# Patient Record
Sex: MALE | Race: WHITE | NOT HISPANIC OR LATINO | Employment: UNEMPLOYED | URBAN - NONMETROPOLITAN AREA
[De-identification: names, ages, dates, MRNs, and addresses within clinical notes are randomized per-mention and may not be internally consistent; named-entity substitution may affect disease eponyms.]

---

## 2017-01-03 ENCOUNTER — OFFICE VISIT (OUTPATIENT)
Dept: CARDIAC SURGERY | Facility: CLINIC | Age: 67
End: 2017-01-03

## 2017-01-03 ENCOUNTER — HOSPITAL ENCOUNTER (OUTPATIENT)
Dept: OTHER | Facility: HOSPITAL | Age: 67
Discharge: HOME | End: 2017-01-03
Attending: THORACIC SURGERY (CARDIOTHORACIC VASCULAR SURGERY) | Admitting: THORACIC SURGERY (CARDIOTHORACIC VASCULAR SURGERY)

## 2017-01-03 VITALS
BODY MASS INDEX: 29.52 KG/M2 | HEIGHT: 74 IN | HEART RATE: 86 BPM | OXYGEN SATURATION: 95 % | DIASTOLIC BLOOD PRESSURE: 74 MMHG | SYSTOLIC BLOOD PRESSURE: 131 MMHG | TEMPERATURE: 98.3 F | WEIGHT: 230 LBS

## 2017-01-03 DIAGNOSIS — I65.23 BILATERAL CAROTID ARTERY STENOSIS: ICD-10-CM

## 2017-01-03 DIAGNOSIS — I73.9 CLAUDICATION (HCC): ICD-10-CM

## 2017-01-03 DIAGNOSIS — J98.11 MILD BASILAR ATELECTASIS OF BOTH LUNGS: Primary | ICD-10-CM

## 2017-01-03 DIAGNOSIS — R09.89 BRUIT: ICD-10-CM

## 2017-01-03 DIAGNOSIS — R60.0 LOCALIZED EDEMA: ICD-10-CM

## 2017-01-03 DIAGNOSIS — M79.671 RIGHT FOOT PAIN: ICD-10-CM

## 2017-01-03 PROCEDURE — 99202 OFFICE O/P NEW SF 15 MIN: CPT | Performed by: THORACIC SURGERY (CARDIOTHORACIC VASCULAR SURGERY)

## 2017-01-03 RX ORDER — POTASSIUM CHLORIDE 20 MEQ/1
20 TABLET, EXTENDED RELEASE ORAL 2 TIMES DAILY
COMMUNITY

## 2017-01-03 RX ORDER — WARFARIN SODIUM 5 MG/1
5 TABLET ORAL
COMMUNITY

## 2017-01-03 RX ORDER — GABAPENTIN 600 MG/1
600 TABLET ORAL 3 TIMES DAILY
COMMUNITY

## 2017-01-03 RX ORDER — TORSEMIDE 20 MG/1
20 TABLET ORAL DAILY
COMMUNITY

## 2017-01-03 RX ORDER — QUETIAPINE FUMARATE 400 MG/1
400 TABLET, FILM COATED ORAL NIGHTLY
COMMUNITY

## 2017-01-03 RX ORDER — INSULIN GLARGINE 100 [IU]/ML
INJECTION, SOLUTION SUBCUTANEOUS DAILY
COMMUNITY
End: 2017-02-14 | Stop reason: CLARIF

## 2017-01-03 RX ORDER — ONDANSETRON 4 MG/1
4 TABLET, FILM COATED ORAL EVERY 8 HOURS PRN
COMMUNITY

## 2017-01-03 RX ORDER — WARFARIN SODIUM 3 MG/1
3 TABLET ORAL
COMMUNITY

## 2017-01-03 RX ORDER — PREGABALIN 50 MG/1
50 CAPSULE ORAL 3 TIMES DAILY
COMMUNITY

## 2017-01-04 ENCOUNTER — OUTSIDE FACILITY SERVICE (OUTPATIENT)
Dept: FAMILY MEDICINE CLINIC | Facility: CLINIC | Age: 67
End: 2017-01-04

## 2017-01-04 PROCEDURE — 99307 SBSQ NF CARE SF MDM 10: CPT | Performed by: FAMILY MEDICINE

## 2017-01-04 NOTE — PROGRESS NOTES
"Yousif Anthony  1950            66 y.o.      1/3/2017    Chief Complaint   Patient presents with   • Deep Vein Thrombosis    66-year-old man resident of [facility] in  [place] under and under the care of  was referred with history of left carotid endarterectomy, bilaterall shoulder replacement arthrodeses, edema of both upper extremities more prominent left and right, edema both lower extremities and suspicion of arterial insufficiency both lower extremities An \"in house\" ultrasound performed by [facility] effort performed Venous Duplex scan  Left upper extremity 11-14-16 with finding of DVT or \"No flow or augmentation in left brachial, basilic or cephalic veins\"  On this basis anticoagulation  Was begun by Dr. Frazier with Coumadin and today;s INR is 2.7 (Interpretation dictated from [place], [place])/ The patient's overwhelming and repeated requests was for something to give him relief of pain of various sorts in multiple locations.        HPI  1. 2013 direct myocardial revascularization ×5 at [facility]  2.  2 shoulder operations Dr. Champagne.[facility]  3.  Right shoulder total prosthesis, Dr. Islas Felch  4.  Left shoulder total prosthesis, Dr. Islas Felch  5.  Arthroscopic surgery right knee ×3 [facility]  6.  Left carotid endarterectomy [facility]  7.  Multiple Back surgeries.        ROS Diabetes mellitus insulin dependant   , generalized \"arthritis\"pain in multiple locations and pain lateral aspect right foot lateral to  proximal 5th metatarsal prominence.    E.R visits 12-9-16  \"Chest x-ray\" Nodular  Opacity right mid lung field . Basilar atelectasis  D-Dimer <270     E.R visit 12-31-16 abdominal pain  CT abdomen without contrast RLL atelectasis fatty infiltration of liver. Labs normal    Current Outpatient Prescriptions:   •  aspirin 81 MG tablet, Take 81 mg by mouth Daily., " "Disp: , Rfl:   •  gabapentin (NEURONTIN) 600 MG tablet, Take 600 mg by mouth 3 (Three) Times a Day., Disp: , Rfl:   •  insulin aspart (novoLOG FLEXPEN) 100 UNIT/ML solution pen-injector sc pen, Inject  under the skin 3 (Three) Times a Day With Meals., Disp: , Rfl:   •  insulin glargine (LANTUS) 100 UNIT/ML injection, Inject  under the skin Daily., Disp: , Rfl:   •  ondansetron (ZOFRAN) 4 MG tablet, Take 4 mg by mouth Every 8 (Eight) Hours As Needed for nausea or vomiting., Disp: , Rfl:   •  potassium chloride (K-DUR,KLOR-CON) 20 MEQ CR tablet, Take 20 mEq by mouth 2 (Two) Times a Day., Disp: , Rfl:   •  pregabalin (LYRICA) 50 MG capsule, Take 50 mg by mouth 3 (Three) Times a Day., Disp: , Rfl:   •  QUEtiapine (SEROQUEL) 400 MG tablet, Take 400 mg by mouth Every Night., Disp: , Rfl:   •  torsemide (DEMADEX) 20 MG tablet, Take 20 mg by mouth Daily. 2 tab. By mouth daily, Disp: , Rfl:   •  warfarin (COUMADIN) 3 MG tablet, Take 3 mg by mouth Daily., Disp: , Rfl:   •  warfarin (COUMADIN) 5 MG tablet, Take 5 mg by mouth Daily., Disp: , Rfl:     The following portions of the patient's history were reviewed and updated as appropriate: allergies, current medications, past family history, past medical history, past social history, past surgical history and problem list.    Physical Exam  Visit Vitals   • /74 (BP Location: Left arm)   • Pulse 86   • Temp 98.3 °F (36.8 °C) (Oral)   • Ht 74\" (188 cm)   • Wt 230 lb (104 kg)   • SpO2 95%   • BMI 29.53 kg/m2       HEENT: Semi-vertical scar left neck marking site of previous carotid endarterectomy    CHEST: Relatively clear to auscultation    HEART: Regular rate and rhythm    ABDOMEN: Rotund    EXTREMITIES: Mild edema present in both lower extremities making it difficult to palpate ankle  pulses with the patient in the sitting position in his wheelchair    VASCULAR LAB STUDIES:DEV (1-3-17)    Right 0.93        Left 0.95         WAVE FORMS                             PT  " "Triphasic          Triphasic                                                               DP   Triphasic         Triphasic  Arterial perfusion both lower extremities at rest normal and adequate    CAROTID DUPLEX SCAN    (1-3-17)  RIGHT   0-15%             LEFT  0-15%           VERTEBRAL FLOW                    Antegrade                    Antegrade    BILATERAL LOWER EXTREMITY VENOUS DUPLEX SCAN    NO DVT                                                              U/S AORTA NO AAA          RADIOLOGY:      Mild basilar atelectasis of both lungs [J98.11]    IMPRESSION:  1. Normal carotid arteries bilaterally  2.No DVT in either lower extremity by venous duplex scan  3.Normal arterial perfusion both lower extremities at rest  4.\"Negative\" x-ray right foot  Right foot likely needs to be off-loaded with protective foot device                Assessment/Plan  Yousif was seen today for deep vein thrombosis.    Diagnoses and all orders for this visit:    Mild basilar atelectasis of both lungs  -     CT Angiogram Chest With & Without Contrast    Claudication  -     Ankle Brachial Index    Localized edema  -     Duplex Venous Lower Extremity - Bilateral CAR    Bruit  -     US Abdominal Aorta Limited    Bilateral carotid artery stenosis  -     US carotid bilateral    Right foot pain  -     XR foot 2 vw right                Plan   CT chest with I.V contrast (Administer contrast via left upper extremity)  Return when that CT study available.        "

## 2017-01-16 ENCOUNTER — OUTSIDE FACILITY SERVICE (OUTPATIENT)
Dept: FAMILY MEDICINE CLINIC | Facility: CLINIC | Age: 67
End: 2017-01-16

## 2017-01-16 PROCEDURE — 99307 SBSQ NF CARE SF MDM 10: CPT | Performed by: FAMILY MEDICINE

## 2017-01-30 ENCOUNTER — OUTSIDE FACILITY SERVICE (OUTPATIENT)
Dept: FAMILY MEDICINE CLINIC | Facility: CLINIC | Age: 67
End: 2017-01-30

## 2017-01-30 PROCEDURE — 99307 SBSQ NF CARE SF MDM 10: CPT | Performed by: FAMILY MEDICINE

## 2017-02-01 ENCOUNTER — OUTSIDE FACILITY SERVICE (OUTPATIENT)
Dept: FAMILY MEDICINE CLINIC | Facility: CLINIC | Age: 67
End: 2017-02-01

## 2017-02-01 PROCEDURE — 99307 SBSQ NF CARE SF MDM 10: CPT | Performed by: FAMILY MEDICINE

## 2017-02-08 ENCOUNTER — APPOINTMENT (OUTPATIENT)
Dept: LAB | Facility: HOSPITAL | Age: 67
End: 2017-02-08

## 2017-02-08 ENCOUNTER — OFFICE VISIT (OUTPATIENT)
Dept: CARDIOLOGY | Facility: CLINIC | Age: 67
End: 2017-02-08

## 2017-02-08 VITALS
BODY MASS INDEX: 32.24 KG/M2 | HEIGHT: 74 IN | SYSTOLIC BLOOD PRESSURE: 140 MMHG | DIASTOLIC BLOOD PRESSURE: 70 MMHG | HEART RATE: 84 BPM | OXYGEN SATURATION: 96 % | WEIGHT: 251.25 LBS

## 2017-02-08 DIAGNOSIS — Z86.73 H/O: CVA (CEREBROVASCULAR ACCIDENT): ICD-10-CM

## 2017-02-08 DIAGNOSIS — E78.2 MIXED HYPERLIPIDEMIA: ICD-10-CM

## 2017-02-08 DIAGNOSIS — Z79.4 TYPE 2 DIABETES MELLITUS WITH COMPLICATION, WITH LONG-TERM CURRENT USE OF INSULIN (HCC): ICD-10-CM

## 2017-02-08 DIAGNOSIS — Z86.718 HISTORY OF DVT (DEEP VEIN THROMBOSIS): ICD-10-CM

## 2017-02-08 DIAGNOSIS — R60.1 GENERALIZED EDEMA: Primary | ICD-10-CM

## 2017-02-08 DIAGNOSIS — I25.10 CORONARY ARTERY DISEASE INVOLVING NATIVE CORONARY ARTERY OF NATIVE HEART WITHOUT ANGINA PECTORIS: ICD-10-CM

## 2017-02-08 DIAGNOSIS — M10.042 ACUTE IDIOPATHIC GOUT OF LEFT HAND: ICD-10-CM

## 2017-02-08 DIAGNOSIS — E11.8 TYPE 2 DIABETES MELLITUS WITH COMPLICATION, WITH LONG-TERM CURRENT USE OF INSULIN (HCC): ICD-10-CM

## 2017-02-08 DIAGNOSIS — K59.00 CONSTIPATION, UNSPECIFIED CONSTIPATION TYPE: ICD-10-CM

## 2017-02-08 DIAGNOSIS — E78.1 HYPERTRIGLYCERIDEMIA: ICD-10-CM

## 2017-02-08 PROBLEM — I65.23 BILATERAL CAROTID ARTERY STENOSIS: Status: RESOLVED | Noted: 2017-01-03 | Resolved: 2017-02-08

## 2017-02-08 LAB — URATE SERPL-MCNC: 7.6 MG/DL (ref 2.5–8.5)

## 2017-02-08 PROCEDURE — 84550 ASSAY OF BLOOD/URIC ACID: CPT | Performed by: NURSE PRACTITIONER

## 2017-02-08 PROCEDURE — 96372 THER/PROPH/DIAG INJ SC/IM: CPT | Performed by: NURSE PRACTITIONER

## 2017-02-08 PROCEDURE — 99203 OFFICE O/P NEW LOW 30 MIN: CPT | Performed by: NURSE PRACTITIONER

## 2017-02-08 RX ORDER — ALLOPURINOL 100 MG/1
100 TABLET ORAL DAILY
Qty: 30 TABLET | Refills: 0 | Status: SHIPPED | OUTPATIENT
Start: 2017-02-08

## 2017-02-08 RX ORDER — COLCHICINE 0.6 MG/1
TABLET ORAL
Qty: 15 TABLET | Refills: 0 | Status: SHIPPED | OUTPATIENT
Start: 2017-02-08

## 2017-02-08 RX ORDER — FUROSEMIDE 10 MG/ML
40 INJECTION INTRAMUSCULAR; INTRAVENOUS ONCE
Status: COMPLETED | OUTPATIENT
Start: 2017-02-08 | End: 2017-02-08

## 2017-02-08 RX ORDER — DOCUSATE SODIUM 100 MG/1
100 CAPSULE, LIQUID FILLED ORAL 2 TIMES DAILY
Qty: 60 CAPSULE | Refills: 3 | Status: SHIPPED | OUTPATIENT
Start: 2017-02-08

## 2017-02-08 RX ORDER — ATORVASTATIN CALCIUM 40 MG/1
40 TABLET, FILM COATED ORAL NIGHTLY
Qty: 30 TABLET | Refills: 3 | Status: SHIPPED | OUTPATIENT
Start: 2017-02-08

## 2017-02-08 RX ORDER — DOCUSATE SODIUM 50 MG/5ML
150 LIQUID ORAL 2 TIMES DAILY
Status: DISCONTINUED | OUTPATIENT
Start: 2017-02-08 | End: 2017-02-08

## 2017-02-08 RX ORDER — FENOFIBRATE 145 MG/1
145 TABLET, COATED ORAL DAILY
Qty: 30 TABLET | Refills: 3 | Status: SHIPPED | OUTPATIENT
Start: 2017-02-08

## 2017-02-08 RX ADMIN — FUROSEMIDE 40 MG: 10 INJECTION INTRAMUSCULAR; INTRAVENOUS at 14:50

## 2017-02-08 RX ADMIN — FUROSEMIDE 40 MG: 10 INJECTION INTRAMUSCULAR; INTRAVENOUS at 14:57

## 2017-02-08 NOTE — PROGRESS NOTES
CC: Lower extremity edema    Leg Swelling   This is a chronic problem. The current episode started more than 1 year ago. The problem occurs intermittently. The problem has been waxing and waning. Associated symptoms include fatigue and weakness. Pertinent negatives include no abdominal pain, change in bowel habit, chest pain, chills, coughing, fever, myalgias, numbness or rash. Nothing aggravates the symptoms. He has tried position changes (loop diuretic, negative DVT, normal ABIs) for the symptoms. The treatment provided moderate relief.   Hand Pain    The incident occurred more than 1 week ago. There was no injury mechanism. The pain is present in the left wrist and left hand. The quality of the pain is described as burning, aching and shooting. The pain does not radiate. The pain is at a severity of 10/10. The pain is severe. The pain has been worsening since the incident. Pertinent negatives include no chest pain, numbness or tingling. The symptoms are aggravated by movement (anything touching it). He has tried elevation and NSAIDs (neurontin/lyrica) for the symptoms. The treatment provided no relief.         Cardiac Risk Factors:  arteriosclerotic heart disease, diabetes mellitus, hypercholesterolemia, hypertension, peripheral vascular disease and Sedentary life style    HIGH risk    PMH:  1. 2013 direct myocardial revascularization ×5 at [facility]  2. 2 shoulder operations Dr. Champagne.[facility]  3.  Right shoulder total prosthesis, Dr. Islas [place]  4.  Left shoulder total prosthesis, Dr. Islas [place]  5. Arthroscopic surgery right knee ×3 [facility]  6.  Left carotid endarterectomy [facility]  7. Multiple Back surgeries.  8. Neuropathy   9. Arthritis   10. Mental illness.   11. Coronary artery disease.   12. Deep venous thrombosis   13. Diabetes mellitus   14. Chronic back pain.   15. Anxiety   16. Depression  17. Substance abuse      Review of Systems   Constitution: Positive for fatigue and weakness. Negative for chills, decreased appetite and fever.   HENT: Negative.    Eyes: Negative.    Cardiovascular: Positive for leg swelling. Negative for chest pain, claudication, dyspnea on exertion, irregular heartbeat and palpitations.   Respiratory: Negative for cough, shortness of breath and wheezing.    Endocrine: Positive for polydipsia, polyphagia and polyuria.   Skin: Negative for dry skin, flushing and rash.   Musculoskeletal: Negative for falls and myalgias.        Left hand swelling   Gastrointestinal: Negative for abdominal pain, change in bowel habit and melena.   Genitourinary: Negative for frequency and hematuria.   Neurological: Negative for dizziness, light-headedness, loss of balance, numbness and tingling.   Psychiatric/Behavioral: Negative for altered mental status and memory loss. The patient is not nervous/anxious.        Current Outpatient Prescriptions   Medication Sig Dispense Refill   • aspirin 81 MG tablet Take 81 mg by mouth Daily.     • gabapentin (NEURONTIN) 600 MG tablet Take 600 mg by mouth 3 (Three) Times a Day.     • insulin aspart (novoLOG FLEXPEN) 100 UNIT/ML solution pen-injector sc pen Inject  under the skin 3 (Three) Times a Day With Meals.     • insulin glargine (LANTUS) 100 UNIT/ML injection Inject  under the skin Daily.     • ondansetron (ZOFRAN) 4 MG tablet Take 4 mg by mouth Every 8 (Eight) Hours As Needed for nausea or vomiting.     • potassium chloride (K-DUR,KLOR-CON) 20 MEQ CR tablet Take 20 mEq by mouth 2 (Two) Times a Day.     • pregabalin (LYRICA) 50 MG capsule Take 50 mg by mouth 3 (Three) Times a Day.     • QUEtiapine (SEROQUEL) 400 MG tablet Take 400 mg by mouth Every Night.     • torsemide (DEMADEX) 20 MG tablet Take 20 mg by mouth Daily. 2 tab. By mouth daily     • warfarin (COUMADIN) 3 MG tablet Take 3 mg by mouth Daily.     • warfarin (COUMADIN) 5 MG tablet Take 5 mg by mouth Daily.       No  "current facility-administered medications for this visit.      OBJECTIVE:    Physical Exam:   Physical Exam   Constitutional: He is oriented to person, place, and time. He appears well-developed and well-nourished. No distress.   HENT:   Head: Normocephalic.   Neck: No JVD present.   Cardiovascular: Normal rate, regular rhythm, S1 normal, S2 normal, normal heart sounds and intact distal pulses.    No murmur heard.  Pulmonary/Chest: Effort normal. No respiratory distress. He has no wheezes. He has rales in the right lower field and the left lower field.   Abdominal: Soft. Bowel sounds are normal. He exhibits distension.   Musculoskeletal: Normal range of motion. He exhibits edema.        Left hand: He exhibits tenderness, bony tenderness and swelling.        Right foot: There is tenderness and swelling.        Left foot: There is tenderness and swelling.   Neurological: He is alert and oriented to person, place, and time.   Skin: Skin is warm and dry. No erythema.   Psychiatric: He has a normal mood and affect. His behavior is normal. Judgment and thought content normal.     Vitals:    02/08/17 1334   BP: 140/70   BP Location: Left arm   Patient Position: Sitting   Cuff Size: Adult   Pulse: 84   SpO2: 96%   Weight: 251 lb 4 oz (114 kg)             230lbs on 1/3/2017   Height: 74\" (188 cm)       DATA REVIEWED:   CXR:   None    U/S 12/09/2016:  PROCEDURE- Left upper extremity venous duplex      COMPARISON- None      HISTORY- Painful left arm with swelling.      FINDINGS- Realtime grayscale and color-flow imaging was performed of  the left upper extremity(s).      The deep veins demonstrate normal compressibility, respiratory  variation and augmentation with distal compression. No thrombus is  identified.      CONCLUSION-   No DVT    Xray:  Procedure- Right foot.       Indication- Pain right foot..      Technique- Three views.      Prior relevant exam- None.      Soft tissue prominence over the dorsum of the right foot. " "The right  foot is otherwise unremarkable. No evidence a fracture or areas of  bony destruction.  CT 2016:   FINDINGS-       Abdomen- Coronary artery calcifications and other vascular  calcifications are noted. There is slight elevation of the right  hemidiaphragm. There is airspace disease in the right lower lobe  consistent with atelectasis or pneumonia. There is minimal linear  atelectasis or scarring in the left lung base. There is fatty  infiltration of the liver. There are no renal or ureteral stones and  no hydronephrosis. The unenhanced solid abdominal organs are otherwise  unremarkable. There is no abdominal adenopathy. There is ectasia of  the abdominal aorta however is just below size for aneurysm measuring  2.9 cm in greatest diameter and is not 1.5 times its normal proximal  segment. There is no free fluid or free air within the abdomen. The  abdominal portion of the GI tract is unremarkable.      Pelvis- There is no free fluid in the pelvis. There is no pelvic  adenopathy. Pelvic portion of the GI tract including the appendix is  unremarkable. Degenerative and postsurgical changes are noted in the  lumbar spine.      CONCLUSION-   1. Right lower lobe atelectasis or pneumonia.  2. Fatty infiltration of the liver.    Chest CT- pending    Labs:  Recent labs from [facility]:  2017  PT: 19.4  INR: 1.66    2/3/2017  Na: 139  K: 3.8  Cl: 99  Co2: 28  Ionized Ca: 4.2  Glucose: 231  BUN: 25  Cr: 0.9  GFR: 84  ALT: 37 AST: 22  HgA1c: 8.0  Chol: 284  Tri  HDL: 25  LDL: UTD  Lab Results   Component Value Date    WBC 4.2 2016    HGB 14.1 2016    HCT 39.5 2016    MCV 89.6 2016     (L) 2016         EKG:   NSR with RBBB 2016    TTE:     From Dr. Hamman's note:  IMPRESSION:  1. Normal carotid arteries bilaterally  2.No DVT in either lower extremity by venous duplex scan  3.Normal arterial perfusion both lower extremities at rest  4.\"Negative\" x-ray right foot " Right foot likely needs to be off-loaded with protective foot device    The following portions of the patient's history were reviewed and updated as appropriate: allergies, current medications, past family history, past medical history, past social history, past surgical history and problem list.    ASSESSMENT/PLAN:    Most likely HFpEF with diastolic dysfunction due to ICM and hypertension.  Presented an overview of heart failure, expected course, considerations, risk factors and exacerbation prevention.  Recommended daily weight monitoring. Information provided.  Recommended restricted dietary sodium intake of 2g/day. He said his diet at [facility] is low salt  Fluid restrictions of 2L/day. This is an area he needs to work on.  Discussed patient action plan for heart failure;  Recommended avoiding NSAIDs use.  Discussed warning signs requiring additional medical attention for heart failure.  Do not hesitate to contact this office for further symptoms or concerns. Contact information provided to the patient and understanding verbalized.     Follow up on Tuesday 2/14/2017 for echo and recheck of symptoms/new medications    The primary encounter diagnosis was Generalized edema. Diagnoses of Coronary artery disease involving native coronary artery of native heart without angina pectoris, Hypertriglyceridemia, Mixed hyperlipidemia, Acute idiopathic gout of left hand, Constipation, unspecified constipation type, Type 2 diabetes mellitus with complication, with long-term current use of insulin, History of DVT (deep vein thrombosis), and H/O: CVA (cerebrovascular accident) were also pertinent to this visit.    1. Localized edema  - Adult Transthoracic Echo Complete  - furosemide (LASIX) injection 40 mg; Inject 4 mL under the skin 1 (One) Time.  - furosemide (LASIX) injection 40 mg; Inject 4 mL under the skin 1 (One) Time.    2. Coronary artery disease involving native coronary artery of native heart without angina  pectoris  [place] Score: 48.5% 10 year risk CVA/MI  - ASA recommended (taking)  - high intensity statin recommended (Lipitor 40mg)  - BP control recommended. Will await echo results for best choice medication    3. Hypertriglyceridemia  Triclor  Educated about sweets and high fat foods.    4. Mixed hyperlipidemia  Triclor, Lipitor    5. Acute idiopathic gout of left hand  - Uric Acid  -Colchicine 0.6mg daily x 2 weeks, 1 dose= 2 tabs  -Allopurinol 100mg daily for prophylaxsis    DUE TO FINDINGS OF HYPERVOLEMIA IN THE SETTING OF POSSIBLE HFpEF WE WILL PROCEED WITH LASIX 80mg MG SUBCUTANEOUS INFUSION.   TIME:1450  LASIX 80mg/4ML SUBCUTANEOUS left LOWER QUADRANT OF ABDOMEN INFUSED OVER 10 MINUTES FOLLOWED BY 0.9% NA+ CHLORIDE FLUSH OF 10ML INFUSION PER NICHOLAS OLIVEIRA.

## 2017-02-09 PROBLEM — Z86.718 HISTORY OF DVT (DEEP VEIN THROMBOSIS): Status: ACTIVE | Noted: 2017-02-09

## 2017-02-09 PROBLEM — K59.00 CONSTIPATION: Status: ACTIVE | Noted: 2017-02-09

## 2017-02-09 PROBLEM — E11.8 TYPE 2 DIABETES MELLITUS WITH COMPLICATION, WITH LONG-TERM CURRENT USE OF INSULIN (HCC): Status: ACTIVE | Noted: 2017-02-09

## 2017-02-09 PROBLEM — Z86.73 H/O: CVA (CEREBROVASCULAR ACCIDENT): Status: ACTIVE | Noted: 2017-02-09

## 2017-02-09 PROBLEM — Z79.4 TYPE 2 DIABETES MELLITUS WITH COMPLICATION, WITH LONG-TERM CURRENT USE OF INSULIN (HCC): Status: ACTIVE | Noted: 2017-02-09

## 2017-02-14 ENCOUNTER — OFFICE VISIT (OUTPATIENT)
Dept: CARDIOLOGY | Facility: CLINIC | Age: 67
End: 2017-02-14

## 2017-02-14 VITALS
WEIGHT: 246.44 LBS | DIASTOLIC BLOOD PRESSURE: 72 MMHG | HEART RATE: 81 BPM | HEIGHT: 74 IN | OXYGEN SATURATION: 95 % | BODY MASS INDEX: 31.63 KG/M2 | SYSTOLIC BLOOD PRESSURE: 136 MMHG

## 2017-02-14 DIAGNOSIS — I25.10 CORONARY ARTERY DISEASE INVOLVING NATIVE CORONARY ARTERY OF NATIVE HEART WITHOUT ANGINA PECTORIS: ICD-10-CM

## 2017-02-14 DIAGNOSIS — M10.042 ACUTE IDIOPATHIC GOUT OF LEFT HAND: ICD-10-CM

## 2017-02-14 DIAGNOSIS — E78.2 MIXED HYPERLIPIDEMIA: ICD-10-CM

## 2017-02-14 DIAGNOSIS — R60.0 LOCALIZED EDEMA: Primary | ICD-10-CM

## 2017-02-14 PROCEDURE — 99214 OFFICE O/P EST MOD 30 MIN: CPT | Performed by: NURSE PRACTITIONER

## 2017-02-14 RX ORDER — CARVEDILOL 3.12 MG/1
3.12 TABLET ORAL 2 TIMES DAILY
Qty: 60 TABLET | Refills: 3 | Status: SHIPPED | OUTPATIENT
Start: 2017-02-14

## 2017-02-14 NOTE — PROGRESS NOTES
[facility] [place]  CC: Lower extremity edema    Leg Swelling   This is a chronic problem. The current episode started more than 1 year ago. The problem occurs daily. The problem has been gradually improving. Associated symptoms include fatigue and weakness. Pertinent negatives include no abdominal pain, change in bowel habit, chest pain, chills, coughing, fever, myalgias, numbness or rash. The symptoms are aggravated by drinking and eating. He has tried position changes, rest and lying down (loop diuretic, negative DVT, normal ABIs) for the symptoms. The treatment provided moderate relief.   Hand Pain    The incident occurred more than 1 week ago. There was no injury mechanism. The pain is present in the left wrist and left hand. The quality of the pain is described as burning, aching and shooting. The pain does not radiate. The pain is at a severity of 5/10. The pain is moderate. The pain has been improving since the incident. Pertinent negatives include no chest pain, numbness or tingling. The symptoms are aggravated by movement (anything touching it). He has tried elevation and NSAIDs (neurontin/lyrica. Added Gout treatment with success) for the symptoms. The treatment provided significant relief.     Cardiac Risk Factors:  arteriosclerotic heart disease, diabetes mellitus, hypercholesterolemia, hypertension, peripheral vascular disease and Sedentary life style    HIGH risk    PMH:  1. 2013 direct myocardial revascularization ×5 at [facility]  2. 2 shoulder operations Dr. Champagne.[facility]  3.  Right shoulder total prosthesis, Jaimie Villatorobilt  4.  Left shoulder total prosthesis, Jaimie Villtaorobilt  5. Arthroscopic surgery right knee ×3 [facility]  6.  Left carotid endarterectomy [facility]  7. Multiple Back surgeries.  8. Neuropathy   9. Arthritis   10. Mental illness.   11. Coronary artery disease.   12. Deep venous thrombosis    13. Diabetes mellitus   14. Chronic back pain.   15. Anxiety   16. Depression  17. Substance abuse     Review of Systems   Constitution: Positive for fatigue and weakness. Negative for chills, decreased appetite and fever.   HENT: Negative.    Eyes: Negative.    Cardiovascular: Positive for leg swelling. Negative for chest pain, claudication, dyspnea on exertion, irregular heartbeat and palpitations.   Respiratory: Negative for cough, shortness of breath and wheezing.    Endocrine: Positive for polydipsia, polyphagia and polyuria.   Skin: Negative for dry skin, flushing and rash.   Musculoskeletal: Negative for falls and myalgias.        Left hand swelling   Gastrointestinal: Negative for abdominal pain, change in bowel habit and melena.   Genitourinary: Negative for frequency and hematuria.   Neurological: Negative for dizziness, light-headedness, loss of balance, numbness and tingling.   Psychiatric/Behavioral: Negative for altered mental status and memory loss. The patient is not nervous/anxious.        Current Outpatient Prescriptions   Medication Sig Dispense Refill   • aspirin 81 MG tablet Take 81 mg by mouth Daily.     • gabapentin (NEURONTIN) 600 MG tablet Take 600 mg by mouth 3 (Three) Times a Day.     • insulin aspart (novoLOG FLEXPEN) 100 UNIT/ML solution pen-injector sc pen Inject  under the skin 3 (Three) Times a Day With Meals.     • insulin glargine (LANTUS) 100 UNIT/ML injection Inject  under the skin Daily.     • ondansetron (ZOFRAN) 4 MG tablet Take 4 mg by mouth Every 8 (Eight) Hours As Needed for nausea or vomiting.     • potassium chloride (K-DUR,KLOR-CON) 20 MEQ CR tablet Take 20 mEq by mouth 2 (Two) Times a Day.     • pregabalin (LYRICA) 50 MG capsule Take 50 mg by mouth 3 (Three) Times a Day.     • QUEtiapine (SEROQUEL) 400 MG tablet Take 400 mg by mouth Every Night.     • torsemide (DEMADEX) 20 MG tablet Take 40 mg by mouth Daily.   2 tab. By mouth daily     • warfarin (COUMADIN) 3 MG  "tablet Take 3 mg by mouth Daily.     • warfarin (COUMADIN) 5 MG tablet Take 5 mg by mouth Daily.       No current facility-administered medications for this visit.      OBJECTIVE:    Physical Exam:   Physical Exam   Constitutional: He is oriented to person, place, and time. He appears well-developed and well-nourished. No distress.   HENT:   Head: Normocephalic.   Neck: No JVD present.   Cardiovascular: Normal rate, regular rhythm, S1 normal, S2 normal, normal heart sounds and intact distal pulses.    No murmur heard.  Pulmonary/Chest: Effort normal. No respiratory distress. He has no wheezes. He has rales in the right lower field and the left lower field.   Abdominal: Soft. Bowel sounds are normal. He exhibits distension.   Musculoskeletal: Normal range of motion. He exhibits edema (improved, 1+ pitting ankles).        Left hand: He exhibits tenderness, bony tenderness and swelling.        Right foot: There is tenderness and swelling.        Left foot: There is tenderness and swelling.   Neurological: He is alert and oriented to person, place, and time.   Skin: Skin is warm and dry. There is erythema (BLE).   Psychiatric: He has a normal mood and affect. His behavior is normal. Judgment and thought content normal.     Vitals:    02/14/17 1132   BP: 136/72   BP Location: Left arm   Patient Position: Sitting   Cuff Size: Adult   Pulse: 81   SpO2: 95%   Weight: 246 lb 7 oz (112 kg)   Height: 74\" (188 cm)   PainSc: 0-No pain       DATA REVIEWED:   CXR:   None    U/S 12/09/2016:  PROCEDURE- Left upper extremity venous duplex      COMPARISON- None      HISTORY- Painful left arm with swelling.      FINDINGS- Realtime grayscale and color-flow imaging was performed of  the left upper extremity(s).      The deep veins demonstrate normal compressibility, respiratory  variation and augmentation with distal compression. No thrombus is  identified.      CONCLUSION-   No DVT    Xray:  Procedure- Right foot.       Indication- Pain " right foot..      Technique- Three views.      Prior relevant exam- None.      Soft tissue prominence over the dorsum of the right foot. The right  foot is otherwise unremarkable. No evidence a fracture or areas of  bony destruction.  CT 2016:   FINDINGS-       Abdomen- Coronary artery calcifications and other vascular  calcifications are noted. There is slight elevation of the right  hemidiaphragm. There is airspace disease in the right lower lobe  consistent with atelectasis or pneumonia. There is minimal linear  atelectasis or scarring in the left lung base. There is fatty  infiltration of the liver. There are no renal or ureteral stones and  no hydronephrosis. The unenhanced solid abdominal organs are otherwise  unremarkable. There is no abdominal adenopathy. There is ectasia of  the abdominal aorta however is just below size for aneurysm measuring  2.9 cm in greatest diameter and is not 1.5 times its normal proximal  segment. There is no free fluid or free air within the abdomen. The  abdominal portion of the GI tract is unremarkable.      Pelvis- There is no free fluid in the pelvis. There is no pelvic  adenopathy. Pelvic portion of the GI tract including the appendix is  unremarkable. Degenerative and postsurgical changes are noted in the  lumbar spine.      CONCLUSION-   1. Right lower lobe atelectasis or pneumonia.  2. Fatty infiltration of the liver.    Chest CT- pending    Labs:  Recent labs from [facility]:  2017  PT: 19.4  INR: 1.66    2/3/2017  Na: 139  K: 3.8  Cl: 99  Co2: 28  Ionized Ca: 4.2  Glucose: 231  BUN: 25  Cr: 0.9  GFR: 84  ALT: 37 AST: 22  HgA1c: 8.0  Chol: 284  Tri  HDL: 25  LDL: UTD  Lab Results   Component Value Date    WBC 4.2 2016    HGB 14.1 2016    HCT 39.5 2016    MCV 89.6 2016     (L) 2016     EKG:   NSR with RBBB 2016    TTE:   Completed today    From Dr. Hamman's note:  IMPRESSION:  1. Normal carotid arteries  "bilaterally  2.No DVT in either lower extremity by venous duplex scan  3.Normal arterial perfusion both lower extremities at rest  4.\"Negative\" x-ray right foot Right foot likely needs to be off-loaded with protective foot device    The following portions of the patient's history were reviewed and updated as appropriate: allergies, current medications, past family history, past medical history, past social history, past surgical history and problem list.    ASSESSMENT/PLAN:  Mr. Cruz has shown great improvement with dietary compliance this week with a weight loss of 5 pounds. His BLE edema is improved to just his ankles. Upper thighs and abdomen are soft to the touch. He appears euvolemic with no acute distress. He is well perfused. BP is still slightly elevated. Will add BB today after echo results. Left hand redness- gone. Left hand swelling- trace. Right & Left foot redness- improved. Tenderness- improved all over.    Follow up 3 weeks    Most likely HFpEF with diastolic dysfunction due to CAD and hypertension.  Reiterated all educational points this visit.  Continue daily weight monitoring.  Continue restricted dietary sodium intake.  Continue restricted fluid intake  Discussed patient action plan for heart failure. Contact information for this office verbalized and written on [facility] home form  Recommended avoiding NSAIDs use.  Discussed warning signs requiring additional medical attention for heart failure.   Education points repeated back by patient.      1. Localized edema  Improved.  Echo completed today    2. Coronary artery disease involving native coronary artery of native heart without angina pectoris  Livingston Score: 48.5% 10 year risk CVA/MI  - ASA recommended (taking)  - high intensity statin recommended (Lipitor 40mg)  - BP control recommended. Added Coreg 3.125mg PO BID    3. Hypertriglyceridemia  Triclor  Educated about sweets and high fat foods.    4. Mixed hyperlipidemia  Triclor, Lipitor    5. " Acute idiopathic gout of left hand + gouty arthritis  -Colchicine 0.6mg daily x 2 weeks, 1st dose= 2 tabs  -Allopurinol 100mg daily for prophylaxsis

## 2017-02-15 ENCOUNTER — OUTSIDE FACILITY SERVICE (OUTPATIENT)
Dept: FAMILY MEDICINE CLINIC | Facility: CLINIC | Age: 67
End: 2017-02-15

## 2017-02-15 PROBLEM — I50.30 (HFPEF) HEART FAILURE WITH PRESERVED EJECTION FRACTION (HCC): Status: ACTIVE | Noted: 2017-02-15

## 2017-02-15 PROCEDURE — 99307 SBSQ NF CARE SF MDM 10: CPT | Performed by: FAMILY MEDICINE

## 2017-02-16 ENCOUNTER — HOSPITAL ENCOUNTER (EMERGENCY)
Facility: HOSPITAL | Age: 67
Discharge: HOME | End: 2017-02-17
Attending: EMERGENCY MEDICINE | Admitting: EMERGENCY MEDICINE

## 2017-02-16 DIAGNOSIS — R73.9 HYPERGLYCEMIA: Primary | ICD-10-CM

## 2017-02-16 PROCEDURE — 80053 COMPREHEN METABOLIC PANEL: CPT | Performed by: EMERGENCY MEDICINE

## 2017-02-16 PROCEDURE — 99284 EMERGENCY DEPT VISIT MOD MDM: CPT

## 2017-02-16 PROCEDURE — 96360 HYDRATION IV INFUSION INIT: CPT

## 2017-02-16 PROCEDURE — 85025 COMPLETE CBC W/AUTO DIFF WBC: CPT | Performed by: EMERGENCY MEDICINE

## 2017-02-16 PROCEDURE — 93010 ELECTROCARDIOGRAM REPORT: CPT | Performed by: INTERNAL MEDICINE

## 2017-02-16 PROCEDURE — 81001 URINALYSIS AUTO W/SCOPE: CPT | Performed by: EMERGENCY MEDICINE

## 2017-02-16 PROCEDURE — 93005 ELECTROCARDIOGRAM TRACING: CPT | Performed by: EMERGENCY MEDICINE

## 2017-02-16 RX ORDER — SODIUM CHLORIDE 0.9 % (FLUSH) 0.9 %
10 SYRINGE (ML) INJECTION AS NEEDED
Status: DISCONTINUED | OUTPATIENT
Start: 2017-02-16 | End: 2017-02-17 | Stop reason: HOSPADM

## 2017-02-17 VITALS
OXYGEN SATURATION: 90 % | DIASTOLIC BLOOD PRESSURE: 76 MMHG | BODY MASS INDEX: 30.93 KG/M2 | WEIGHT: 241 LBS | SYSTOLIC BLOOD PRESSURE: 113 MMHG | HEIGHT: 74 IN | RESPIRATION RATE: 20 BRPM | TEMPERATURE: 99.7 F | HEART RATE: 78 BPM

## 2017-02-17 LAB
ALBUMIN SERPL-MCNC: 4.4 G/DL (ref 3.4–4.8)
ALBUMIN/GLOB SERPL: 1.3 G/DL (ref 1.1–1.8)
ALP SERPL-CCNC: 90 U/L (ref 38–126)
ALT SERPL W P-5'-P-CCNC: 52 U/L (ref 21–72)
ANION GAP SERPL CALCULATED.3IONS-SCNC: 13 MMOL/L (ref 5–15)
AST SERPL-CCNC: 48 U/L (ref 17–59)
BACTERIA UR QL AUTO: ABNORMAL /HPF
BASOPHILS # BLD AUTO: 0.02 10*3/MM3 (ref 0–0.2)
BASOPHILS NFR BLD AUTO: 0.3 % (ref 0–2)
BILIRUB SERPL-MCNC: 1.4 MG/DL (ref 0.2–1.3)
BILIRUB UR QL STRIP: NEGATIVE
BUN BLD-MCNC: 24 MG/DL (ref 7–21)
BUN/CREAT SERPL: 26.1 (ref 7–25)
CALCIUM SPEC-SCNC: 9.4 MG/DL (ref 8.4–10.2)
CHLORIDE SERPL-SCNC: 92 MMOL/L (ref 95–110)
CLARITY UR: CLEAR
CO2 SERPL-SCNC: 28 MMOL/L (ref 22–31)
COLOR UR: YELLOW
CREAT BLD-MCNC: 0.92 MG/DL (ref 0.7–1.3)
DEPRECATED RDW RBC AUTO: 42.7 FL (ref 35.1–43.9)
EOSINOPHIL # BLD AUTO: 0.05 10*3/MM3 (ref 0–0.7)
EOSINOPHIL NFR BLD AUTO: 0.7 % (ref 0–7)
ERYTHROCYTE [DISTWIDTH] IN BLOOD BY AUTOMATED COUNT: 13.1 % (ref 11.5–14.5)
GFR SERPL CREATININE-BSD FRML MDRD: 82 ML/MIN/1.73 (ref 49–113)
GLOBULIN UR ELPH-MCNC: 3.4 GM/DL (ref 2.3–3.5)
GLUCOSE BLD-MCNC: 329 MG/DL (ref 60–100)
GLUCOSE BLDC GLUCOMTR-MCNC: 275 MG/DL (ref 70–130)
GLUCOSE UR STRIP-MCNC: ABNORMAL MG/DL
HCT VFR BLD AUTO: 41.8 % (ref 39–49)
HGB BLD-MCNC: 14.9 G/DL (ref 13.7–17.3)
HGB UR QL STRIP.AUTO: ABNORMAL
HOLD SPECIMEN: NORMAL
HOLD SPECIMEN: NORMAL
HYALINE CASTS UR QL AUTO: ABNORMAL /LPF
IMM GRANULOCYTES # BLD: 0.02 10*3/MM3 (ref 0–0.02)
IMM GRANULOCYTES NFR BLD: 0.3 % (ref 0–0.5)
KETONES UR QL STRIP: NEGATIVE
LEUKOCYTE ESTERASE UR QL STRIP.AUTO: NEGATIVE
LYMPHOCYTES # BLD AUTO: 1.79 10*3/MM3 (ref 0.6–4.2)
LYMPHOCYTES NFR BLD AUTO: 23.7 % (ref 10–50)
MCH RBC QN AUTO: 32 PG (ref 26.5–34)
MCHC RBC AUTO-ENTMCNC: 35.6 G/DL (ref 31.5–36.3)
MCV RBC AUTO: 89.7 FL (ref 80–98)
MONOCYTES # BLD AUTO: 0.63 10*3/MM3 (ref 0–0.9)
MONOCYTES NFR BLD AUTO: 8.4 % (ref 0–12)
NEUTROPHILS # BLD AUTO: 5.03 10*3/MM3 (ref 2–8.6)
NEUTROPHILS NFR BLD AUTO: 66.6 % (ref 37–80)
NITRITE UR QL STRIP: NEGATIVE
PH UR STRIP.AUTO: <=5 [PH] (ref 5–9)
PLATELET # BLD AUTO: 128 10*3/MM3 (ref 150–450)
PMV BLD AUTO: 13.1 FL (ref 8–12)
POTASSIUM BLD-SCNC: 4 MMOL/L (ref 3.5–5.1)
PROT SERPL-MCNC: 7.8 G/DL (ref 6.3–8.6)
PROT UR QL STRIP: NEGATIVE
RBC # BLD AUTO: 4.66 10*6/MM3 (ref 4.37–5.74)
RBC # UR: ABNORMAL /HPF
REF LAB TEST METHOD: ABNORMAL
SODIUM BLD-SCNC: 133 MMOL/L (ref 137–145)
SP GR UR STRIP: 1.03 (ref 1–1.03)
SQUAMOUS #/AREA URNS HPF: ABNORMAL /HPF
UROBILINOGEN UR QL STRIP: ABNORMAL
WBC NRBC COR # BLD: 7.54 10*3/MM3 (ref 3.2–9.8)
WBC UR QL AUTO: ABNORMAL /HPF
WHOLE BLOOD HOLD SPECIMEN: NORMAL
WHOLE BLOOD HOLD SPECIMEN: NORMAL

## 2017-02-17 PROCEDURE — 82962 GLUCOSE BLOOD TEST: CPT

## 2017-02-17 PROCEDURE — 96360 HYDRATION IV INFUSION INIT: CPT

## 2017-02-17 RX ORDER — SODIUM CHLORIDE 0.9 % (FLUSH) 0.9 %
10 SYRINGE (ML) INJECTION AS NEEDED
Status: DISCONTINUED | OUTPATIENT
Start: 2017-02-17 | End: 2017-02-17 | Stop reason: HOSPADM

## 2017-02-17 RX ADMIN — SODIUM CHLORIDE 1000 ML: 900 INJECTION, SOLUTION INTRAVENOUS at 01:35

## 2017-02-17 NOTE — ED PROVIDER NOTES
Subjective   Patient is a 66 y.o. male presenting with hyperglycemia.   History provided by:  EMS personnel   used: No    Hyperglycemia   Blood sugar level PTA:  474  Severity:  Moderate  Onset quality:  Gradual  Duration:  1 hour  Timing:  Constant  Progression:  Resolved  Chronicity:  New  Diabetes status:  Controlled with insulin  Context: not change in medication, not new diabetes diagnosis, not noncompliance, not recent change in diet and not recent illness    Relieved by:  Nothing  Ineffective treatments:  None tried  Associated symptoms: confusion    Associated symptoms: no abdominal pain, no altered mental status, no blurred vision, no chest pain, no dehydration, no diaphoresis, no dizziness, no dysuria, no fatigue, no fever, no increased appetite, no increased thirst, no malaise, no nausea, no polyuria, no shortness of breath, no syncope, no vomiting, no weakness and no weight change    Risk factors: no family hx of diabetes, no hx of DKA, no obesity, no pancreatic disease, no pregnancy and no recent steroid use        Review of Systems   Constitutional: Negative.  Negative for diaphoresis, fatigue and fever.   HENT: Negative.    Eyes: Negative.  Negative for blurred vision.   Respiratory: Negative.  Negative for shortness of breath.    Cardiovascular: Negative.  Negative for chest pain and syncope.   Gastrointestinal: Negative.  Negative for abdominal pain, nausea and vomiting.   Endocrine: Negative for polydipsia and polyuria.   Genitourinary: Negative for dysuria.   Musculoskeletal: Negative.    Skin: Negative.    Neurological: Negative.  Negative for dizziness and weakness.   Psychiatric/Behavioral: Positive for confusion.       No past medical history on file.    Allergies   Allergen Reactions   • Codeine    • Morphine And Related        No past surgical history on file.    No family history on file.    Social History     Social History   • Marital status:      Spouse name:  N/A   • Number of children: N/A   • Years of education: N/A     Social History Main Topics   • Smoking status: Never Smoker   • Smokeless tobacco: Never Used   • Alcohol use No   • Drug use: No   • Sexual activity: Not on file     Other Topics Concern   • Not on file     Social History Narrative       Objective   Physical Exam   Constitutional: He is oriented to person, place, and time. He appears well-developed and well-nourished. No distress.   HENT:   Head: Normocephalic and atraumatic.   Right Ear: External ear normal.   Left Ear: External ear normal.   Nose: Nose normal.   Mouth/Throat: Oropharynx is clear and moist.   Eyes: Conjunctivae and EOM are normal. Pupils are equal, round, and reactive to light. Right eye exhibits no discharge. Left eye exhibits no discharge. No scleral icterus.   Neck: Normal range of motion. Neck supple. No JVD present. No tracheal deviation present.   Cardiovascular: Normal rate, regular rhythm, normal heart sounds and intact distal pulses.  Exam reveals no gallop and no friction rub.    No murmur heard.  Pulmonary/Chest: Effort normal and breath sounds normal. No respiratory distress. He has no wheezes. He has no rales. He exhibits no tenderness.   Abdominal: Soft. Bowel sounds are normal. He exhibits no distension and no mass. There is no tenderness. There is no rebound and no guarding. No hernia.   Musculoskeletal: Normal range of motion. He exhibits no edema, tenderness or deformity.   Neurological: He is alert and oriented to person, place, and time. He displays normal reflexes. No cranial nerve deficit. He exhibits normal muscle tone. Coordination normal.   Skin: Skin is warm and dry. No rash noted. He is not diaphoretic. No erythema. No pallor.   Nursing note and vitals reviewed.      Procedures         ED Course  ED Course      Labs Reviewed   URINALYSIS W/ CULTURE IF INDICATED - Abnormal; Notable for the following:        Result Value    Glucose, UA >=1000 mg/dL (3+) (*)      Blood, UA Large (3+) (*)     All other components within normal limits   URINALYSIS, MICROSCOPIC ONLY - Abnormal; Notable for the following:     RBC, UA 6-12 (*)     All other components within normal limits   COMPREHENSIVE METABOLIC PANEL - Abnormal; Notable for the following:     Glucose 329 (*)     BUN 24 (*)     Sodium 133 (*)     Chloride 92 (*)     Total Bilirubin 1.4 (*)     BUN/Creatinine Ratio 26.1 (*)     All other components within normal limits   CBC WITH AUTO DIFFERENTIAL - Abnormal; Notable for the following:     MPV 13.1 (*)     Platelets 128 (*)     All other components within normal limits   POCT GLUCOSE FINGERSTICK - Abnormal; Notable for the following:     Glucose 275 (*)     All other components within normal limits   RAINBOW DRAW    Narrative:     The following orders were created for panel order [place] Draw.  Procedure                               Abnormality         Status                     ---------                               -----------         ------                     Light Blue Top[[id number]]                                    Final result               Green Top (Gel)[[id number]]                                   Final result               Lavender Top[[id number]]                                      Final result               Gold Top - SST[[id number]]                                    Final result                 Please view results for these tests on the individual orders.   LIGHT BLUE TOP   GREEN TOP   LAVENDER TOP   GOLD TOP - SST   CBC AND DIFFERENTIAL    Narrative:     The following orders were created for panel order CBC & Differential.  Procedure                               Abnormality         Status                     ---------                               -----------         ------                     CBC Auto Differential[[id number]]         Abnormal            Final result                 Please view results for these tests on the individual orders.       No orders to  display               MDM    Final diagnoses:   Hyperglycemia            Sinan Yepez MD  03/09/17 1938

## 2017-02-22 ENCOUNTER — OUTSIDE FACILITY SERVICE (OUTPATIENT)
Dept: FAMILY MEDICINE CLINIC | Facility: CLINIC | Age: 67
End: 2017-02-22

## 2017-02-22 PROCEDURE — 99307 SBSQ NF CARE SF MDM 10: CPT | Performed by: FAMILY MEDICINE

## 2017-03-08 ENCOUNTER — OFFICE VISIT (OUTPATIENT)
Dept: CARDIOLOGY | Facility: CLINIC | Age: 67
End: 2017-03-08

## 2017-03-08 VITALS
DIASTOLIC BLOOD PRESSURE: 68 MMHG | WEIGHT: 243.13 LBS | BODY MASS INDEX: 31.2 KG/M2 | SYSTOLIC BLOOD PRESSURE: 120 MMHG | OXYGEN SATURATION: 94 % | HEIGHT: 74 IN | HEART RATE: 78 BPM

## 2017-03-08 DIAGNOSIS — R60.0 LOCALIZED EDEMA: ICD-10-CM

## 2017-03-08 DIAGNOSIS — I50.30 (HFPEF) HEART FAILURE WITH PRESERVED EJECTION FRACTION (HCC): Primary | ICD-10-CM

## 2017-03-08 PROCEDURE — 99214 OFFICE O/P EST MOD 30 MIN: CPT | Performed by: NURSE PRACTITIONER

## 2017-03-08 NOTE — PROGRESS NOTES
[facility] Visit  CC: Lower extremity edema    Leg Swelling   This is a chronic problem. The current episode started more than 1 year ago. The problem occurs daily. The problem has been waxing and waning. Associated symptoms include fatigue and weakness. Pertinent negatives include no abdominal pain, change in bowel habit, chills, coughing, fever, myalgias or rash. The symptoms are aggravated by drinking and eating. He has tried position changes, rest and lying down (loop diuretic, negative DVT, normal ABIs) for the symptoms. The treatment provided moderate relief.   Congestive Heart Failure   Presents for follow-up visit. Associated symptoms include edema, fatigue and unexpected weight change. Pertinent negatives include no abdominal pain, claudication, palpitations or shortness of breath. The symptoms have been stable. Compliance with total regimen is 51-75%. Compliance problems include adherence to diet and adherence to exercise.  Compliance with diet is 51-75%. Compliance with exercise is 26-50%. Compliance with medications is %.      Cardiac Risk Factors:  arteriosclerotic heart disease, diabetes mellitus, hypercholesterolemia, hypertension, peripheral vascular disease and Sedentary life style    HIGH risk    PMH:  1. 2013 direct myocardial revascularization ×5 at [facility]  2. 2 shoulder operations Dr. Champagne.[facility]  3.  Right shoulder total prosthesis, Dr. Islas Keavy  4.  Left shoulder total prosthesis, Dr. Islas Keavy  5. Arthroscopic surgery right knee ×3 [facility]  6.  Left carotid endarterectomy [facility]  7. Multiple Back surgeries.  8. Neuropathy   9. Arthritis   10. Mental illness.   11. Coronary artery disease.   12. Deep venous thrombosis   13. Diabetes mellitus   14. Chronic back pain.   15. Anxiety   16. Depression  17. Substance abuse     Review of Systems   Constitution: Positive for fatigue,  weakness and unexpected weight change. Negative for chills, decreased appetite and fever.   HENT: Negative.    Eyes: Negative.    Cardiovascular: Positive for leg swelling. Negative for claudication, dyspnea on exertion, irregular heartbeat and palpitations.   Respiratory: Negative for cough, shortness of breath and wheezing.    Endocrine: Positive for polydipsia, polyphagia and polyuria.   Skin: Negative for dry skin, flushing and rash.   Musculoskeletal: Negative for falls and myalgias.        Left hand swelling   Gastrointestinal: Negative for abdominal pain, change in bowel habit and melena.   Genitourinary: Negative for frequency and hematuria.   Neurological: Negative for dizziness, light-headedness and loss of balance.   Psychiatric/Behavioral: Negative for altered mental status and memory loss. The patient is not nervous/anxious.        Current Outpatient Prescriptions:   •  allopurinol (ZYLOPRIM) 100 MG tablet, Take 1 tablet by mouth Daily., Disp: 30 tablet, Rfl: 0  •  aspirin 81 MG tablet, Take 81 mg by mouth Daily., Disp: , Rfl:   •  atorvastatin (LIPITOR) 40 MG tablet, Take 1 tablet by mouth Every Night., Disp: 30 tablet, Rfl: 3  •  carvedilol (COREG) 3.125 MG tablet, Take 1 tablet by mouth 2 (Two) Times a Day., Disp: 60 tablet, Rfl: 3  •  colchicine 0.6 MG tablet, FIRST DOSE: 2 pills. Then once daily, Disp: 15 tablet, Rfl: 0  •  docusate sodium (COLACE) 100 MG capsule, Take 1 capsule by mouth 2 (Two) Times a Day., Disp: 60 capsule, Rfl: 3  •  fenofibrate (TRICOR) 145 MG tablet, Take 1 tablet by mouth Daily., Disp: 30 tablet, Rfl: 3  •  gabapentin (NEURONTIN) 600 MG tablet, Take 600 mg by mouth 3 (Three) Times a Day., Disp: , Rfl:   •  insulin regular (humuLIN R) 500 UNIT/ML CONCENTRATED injection, Inject 30 Units under the skin 3 (Three) Times a Day Before Meals., Disp: , Rfl:   •  ondansetron (ZOFRAN) 4 MG tablet, Take 4 mg by mouth Every 8 (Eight) Hours As Needed for nausea or vomiting., Disp: , Rfl:    •  potassium chloride (K-DUR,KLOR-CON) 20 MEQ CR tablet, Take 20 mEq by mouth 2 (Two) Times a Day., Disp: , Rfl:   •  pregabalin (LYRICA) 50 MG capsule, Take 50 mg by mouth 3 (Three) Times a Day., Disp: , Rfl:   •  QUEtiapine (SEROQUEL) 400 MG tablet, Take 400 mg by mouth Every Night., Disp: , Rfl:   •  torsemide (DEMADEX) 20 MG tablet, Take 20 mg by mouth Daily. 2 tab. By mouth daily, Disp: , Rfl:   •  warfarin (COUMADIN) 3 MG tablet, Take 3 mg by mouth Daily., Disp: , Rfl:   •  warfarin (COUMADIN) 5 MG tablet, Take 5 mg by mouth Daily., Disp: , Rfl:     OBJECTIVE:    Physical Exam:   Physical Exam   Constitutional: He is oriented to person, place, and time. He appears well-developed and well-nourished. No distress.   HENT:   Head: Normocephalic.   Neck: No JVD present.   Cardiovascular: Normal rate, regular rhythm, S1 normal, S2 normal, normal heart sounds and intact distal pulses.    No murmur heard.  Pulmonary/Chest: Effort normal. No respiratory distress. He has no wheezes. He has rales in the right lower field and the left lower field.   Abdominal: Soft. Bowel sounds are normal. He exhibits distension.   Musculoskeletal: Normal range of motion. He exhibits edema (improved, 1+ pitting ankles).        Left hand: He exhibits tenderness, bony tenderness and swelling.        Right foot: There is tenderness and swelling.        Left foot: There is tenderness and swelling.   Neurological: He is alert and oriented to person, place, and time.   Skin: Skin is warm and dry. There is erythema (BLE).   Psychiatric: He has a normal mood and affect. His behavior is normal. Judgment and thought content normal.     There were no vitals filed for this visit.    DATA REVIEWED:   CXR:   None    U/S 12/09/2016:  PROCEDURE- Left upper extremity venous duplex      COMPARISON- None      HISTORY- Painful left arm with swelling.      FINDINGS- Realtime grayscale and color-flow imaging was performed of  the left upper  extremity(s).      The deep veins demonstrate normal compressibility, respiratory  variation and augmentation with distal compression. No thrombus is  identified.      CONCLUSION-   No DVT    Xray:  Procedure- Right foot.       Indication- Pain right foot..      Technique- Three views.      Prior relevant exam- None.      Soft tissue prominence over the dorsum of the right foot. The right  foot is otherwise unremarkable. No evidence a fracture or areas of  bony destruction.  CT 2016:   FINDINGS-       Abdomen- Coronary artery calcifications and other vascular  calcifications are noted. There is slight elevation of the right  hemidiaphragm. There is airspace disease in the right lower lobe  consistent with atelectasis or pneumonia. There is minimal linear  atelectasis or scarring in the left lung base. There is fatty  infiltration of the liver. There are no renal or ureteral stones and  no hydronephrosis. The unenhanced solid abdominal organs are otherwise  unremarkable. There is no abdominal adenopathy. There is ectasia of  the abdominal aorta however is just below size for aneurysm measuring  2.9 cm in greatest diameter and is not 1.5 times its normal proximal  segment. There is no free fluid or free air within the abdomen. The  abdominal portion of the GI tract is unremarkable.      Pelvis- There is no free fluid in the pelvis. There is no pelvic  adenopathy. Pelvic portion of the GI tract including the appendix is  unremarkable. Degenerative and postsurgical changes are noted in the  lumbar spine.      CONCLUSION-   1. Right lower lobe atelectasis or pneumonia.  2. Fatty infiltration of the liver.    Chest CT- pending    Labs:  Recent labs from [facility]:  2017  PT: 19.4  INR: 1.66    2/3/2017  Na: 139  K: 3.8  Cl: 99  Co2: 28  Ionized Ca: 4.2  Glucose: 231  BUN: 25  Cr: 0.9  GFR: 84  ALT: 37 AST: 22  HgA1c: 8.0  Chol: 284  Tri  HDL: 25  LDL: UTD  Lab Results   Component Value Date    WBC 7.54  "02/16/2017    HGB 14.9 02/16/2017    HCT 41.8 02/16/2017    MCV 89.7 02/16/2017     (L) 02/16/2017     EKG:   NSR with RBBB 12/9/2016    TTE 2/14/17:   Interpretation Summary by Dr. Reyes  · All left ventricular wall segments contract normally.  · Left ventricular function is normal.  · Left ventricular diastolic dysfunction (grade I) consistent with impaired relaxation.   Left Ventricle  Left ventricular function is normal. Estimated EF appears to be in the range of 56 - 60%. Normal left ventricular cavity size and wall thickness noted. All left ventricular wall segments contract normally. Septal wall motion is normal. Left ventricular diastolic dysfunction is noted (grade I) consistent with impaired relaxation.    From Dr. Hamman's note:  IMPRESSION:  1. Normal carotid arteries bilaterally  2.No DVT in either lower extremity by venous duplex scan  3.Normal arterial perfusion both lower extremities at rest  4.\"Negative\" x-ray right foot Right foot likely needs to be off-loaded with protective foot device    The following portions of the patient's history were reviewed and updated as appropriate: allergies, current medications, past family history, past medical history, past social history, past surgical history and problem list.    ASSESSMENT/PLAN:  Mr. Cruz has not been following dietary and fluid restrictions at the nursing home. Psych history coupled with complusive behavior.   His BLE edema is improved to just his ankles. Upper thighs and abdomen are soft to the touch. He appears euvolemic with no acute distress. He is well perfused. BP is still slightly elevated. Left hand redness- gone. Left hand swelling- trace. Right & Left foot redness- improved. Tenderness- improved all over.    1a. HFpEF with diastolic dysfunction due to CAD and hypertension.  Reiterated all educational points this visit.  Continue daily weight monitoring.  Continue restricted dietary sodium intake.  Continue restricted fluid " intake  Discussed patient action plan for heart failure. Contact information for this office verbalized and written on [facility] home form  Recommended avoiding NSAIDs use.  Discussed warning signs requiring additional medical attention for heart failure.   Education points repeated back by patient.      1b. Localized edema  Improved.    2. Coronary artery disease involving native coronary artery of native heart without angina pectoris  Brook Score: 48.5% 10 year risk CVA/MI  - ASA recommended (taking)  - high intensity statin recommended (Lipitor 40mg)    3. Hypertriglyceridemia  Triclor  Educated about sweets and high fat foods.    4. Mixed hyperlipidemia  Triclor, Lipitor  Recheck Lipid Profile in 3 months    5. Acute idiopathic gout of left hand + gouty arthritis  Dr. Frazier managing. Increased Allopurinol. Continued Colchine    6. HTN, essential  - BP Improved.  - Coreg 3.125mg PO BID  - Echo showed Grade 1a DD

## 2017-03-08 NOTE — PATIENT INSTRUCTIONS
Weigh 3x a week  Discontinue Fluid Restrictions  Mr. Cruz wants to control his own fluids    BREAKFAST:  240ml - MILK  240ml- COFFEE    SNACK:   240ml - DIET SODA    LUNCH:  360ml - Cold Drink  260ml Jorge-AID    SNACK:  360ml- Cold Drink    SUPPER:  360ml- Jorge-AID    TOTAL:    2280 ml    NO MORE THAN 2500 ml!!!!    Cut out SNACK drink if you want more for meals.

## 2017-03-22 ENCOUNTER — OUTSIDE FACILITY SERVICE (OUTPATIENT)
Dept: FAMILY MEDICINE CLINIC | Facility: CLINIC | Age: 67
End: 2017-03-22

## 2017-03-22 PROCEDURE — 99307 SBSQ NF CARE SF MDM 10: CPT | Performed by: FAMILY MEDICINE

## 2017-03-29 ENCOUNTER — OUTSIDE FACILITY SERVICE (OUTPATIENT)
Dept: FAMILY MEDICINE CLINIC | Facility: CLINIC | Age: 67
End: 2017-03-29

## 2017-03-29 PROCEDURE — 99308 SBSQ NF CARE LOW MDM 20: CPT | Performed by: FAMILY MEDICINE

## 2017-06-12 ENCOUNTER — DOCUMENTATION (OUTPATIENT)
Dept: CARDIOLOGY | Facility: CLINIC | Age: 67
End: 2017-06-12

## 2017-06-12 NOTE — PROGRESS NOTES
Called Laura re: recent labs before his visit today. They informed me that he moved to another [facility] in [place].

## 2018-01-19 ENCOUNTER — EPISODE CHANGES (OUTPATIENT)
Dept: CASE MANAGEMENT | Facility: OTHER | Age: 68
End: 2018-01-19

## 2018-02-22 ENCOUNTER — EPISODE CHANGES (OUTPATIENT)
Dept: CASE MANAGEMENT | Facility: OTHER | Age: 68
End: 2018-02-22

## 2018-05-10 ENCOUNTER — EPISODE CHANGES (OUTPATIENT)
Dept: CASE MANAGEMENT | Facility: OTHER | Age: 68
End: 2018-05-10

## 2018-05-16 ENCOUNTER — EPISODE CHANGES (OUTPATIENT)
Dept: CASE MANAGEMENT | Facility: OTHER | Age: 68
End: 2018-05-16

## 2018-08-17 ENCOUNTER — EPISODE CHANGES (OUTPATIENT)
Dept: CASE MANAGEMENT | Facility: OTHER | Age: 68
End: 2018-08-17